# Patient Record
Sex: FEMALE | Race: WHITE | NOT HISPANIC OR LATINO | Employment: STUDENT | URBAN - METROPOLITAN AREA
[De-identification: names, ages, dates, MRNs, and addresses within clinical notes are randomized per-mention and may not be internally consistent; named-entity substitution may affect disease eponyms.]

---

## 2021-06-28 ENCOUNTER — OFFICE VISIT (OUTPATIENT)
Dept: ORTHOPEDIC SURGERY | Facility: CLINIC | Age: 21
End: 2021-06-28

## 2021-06-28 VITALS
SYSTOLIC BLOOD PRESSURE: 119 MMHG | BODY MASS INDEX: 23.03 KG/M2 | WEIGHT: 122 LBS | HEART RATE: 78 BPM | HEIGHT: 61 IN | DIASTOLIC BLOOD PRESSURE: 75 MMHG

## 2021-06-28 DIAGNOSIS — M22.41 PATELLA, CHONDROMALACIA, RIGHT: ICD-10-CM

## 2021-06-28 DIAGNOSIS — M25.561 RIGHT KNEE PAIN, UNSPECIFIED CHRONICITY: Primary | ICD-10-CM

## 2021-06-28 PROCEDURE — 73562 X-RAY EXAM OF KNEE 3: CPT | Performed by: ORTHOPAEDIC SURGERY

## 2021-06-28 PROCEDURE — 99203 OFFICE O/P NEW LOW 30 MIN: CPT | Performed by: ORTHOPAEDIC SURGERY

## 2021-06-28 RX ORDER — MELOXICAM 7.5 MG/1
7.5 TABLET ORAL DAILY
Qty: 30 TABLET | Refills: 5 | Status: SHIPPED | OUTPATIENT
Start: 2021-06-28

## 2021-06-28 NOTE — PROGRESS NOTES
Subjective: Right knee pain     Patient ID: Ema Gilmore is a 20 y.o. female.    Chief Complaint:    History of Present Illness 20-year-old female seen by me today for the first time regarding right knee pain that she has had for about 3 months.  Notes the onset of the pain after turkey hunting season during which time she would commonly sit in a squatted position or on her knees.  The past 3 months she has had persistent reoccurring medial parapatellar discomfort with climbing steps ascending descending stairs or walking hills.  Denies any prior history of knee pain.  She is not taking any medication including anti-inflammatories for the discomfort.  She describes a achy feeling as a 4 out of 10 on a daily basis.  Not having significant pain at rest.  She is taking anti-inflammatories in the past with not currently taking any medication.       Social History     Occupational History   • Not on file   Tobacco Use   • Smoking status: Never Smoker   • Smokeless tobacco: Never Used   Vaping Use   • Vaping Use: Never used   Substance and Sexual Activity   • Alcohol use: Never   • Drug use: Never   • Sexual activity: Defer      Review of Systems   Constitutional: Negative for chills, diaphoresis, fever and unexpected weight change.   HENT: Negative for hearing loss, nosebleeds, sore throat and tinnitus.    Eyes: Negative for pain and visual disturbance.   Respiratory: Negative for cough, shortness of breath and wheezing.    Cardiovascular: Negative for chest pain and palpitations.   Gastrointestinal: Negative for abdominal pain, diarrhea, nausea and vomiting.   Endocrine: Negative for cold intolerance, heat intolerance and polydipsia.   Genitourinary: Negative for difficulty urinating, dysuria and hematuria.   Musculoskeletal: Positive for arthralgias and myalgias. Negative for joint swelling.   Skin: Negative for rash and wound.   Allergic/Immunologic: Negative for environmental allergies.   Neurological: Negative for  dizziness, syncope and numbness.   Hematological: Does not bruise/bleed easily.   Psychiatric/Behavioral: Negative for dysphoric mood and sleep disturbance. The patient is not nervous/anxious.          Past Medical History:   Diagnosis Date   • Fracture of wrist     left   • Fracture, clavicle      Past Surgical History:   Procedure Laterality Date   • EAR TUBES       History reviewed. No pertinent family history.      Objective:  Vitals:    06/28/21 1041   BP: 119/75   Pulse: 78         06/28/21  1041   Weight: 55.3 kg (122 lb)     Body mass index is 23.05 kg/m².        Ortho Exam   AP lateral sunrise view of the right knee evaluated for complaint is completely within normal limits showing no acute or chronic changes.  The patella is well seated in the trochlear groove.  No prior x-rays available.  She is alert and oriented x3.  Has normocephalic and sclerae clear.  The right knee has no swelling effusion erythema the skin is cool to touch.  She has 0 130 degrees of motion with no patellofemoral crepitus or pain.  No patella subluxation.  Mild discomfort along the medial parapatellar area but negative apprehension sign.  Quad and hamstring function are 5/5.  There is no instability 0 90 degrees nor any varus or valgus instability at 10 to 30 degrees.  No joint line tenderness with negative Jerica's.  Calf is nontender.  Good distal pulses no motor or sensory deficit good capillary refill.  She is taking anti-inflammatories in the past without any GI side effect which is not currently taking any medication at this time.    Assessment:        1. Right knee pain, unspecified chronicity    2. Patella, chondromalacia, right           Plan: Reviewed at length with the patient her history x-rays and physical exam.  She has developed a mild case of patella chondromalacia.  After reviewing treatment options of medication versus injections restart meloxicam 7.5 daily.  She was instructed to take the medication every day  and at the end of 3 weeks if still symptomatic she is to return for cortisone injection otherwise as needed.  Answered all questions            Work Status:    JOCE query complete.    Orders:  Orders Placed This Encounter   Procedures   • XR Knee 3+ View With [place] Right       Medications:  New Medications Ordered This Visit   Medications   • meloxicam (MOBIC) 7.5 MG tablet     Sig: Take 1 tablet by mouth Daily.     Dispense:  30 tablet     Refill:  5       Followup:  Return if symptoms worsen or fail to improve.          Dictated utilizing Dragon dictation

## 2021-07-06 ENCOUNTER — TELEPHONE (OUTPATIENT)
Dept: ORTHOPEDIC SURGERY | Facility: CLINIC | Age: 21
End: 2021-07-06

## 2021-07-16 ENCOUNTER — HOSPITAL ENCOUNTER (OUTPATIENT)
Dept: PHYSICAL THERAPY | Facility: HOSPITAL | Age: 21
Setting detail: THERAPIES SERIES
Discharge: HOME | End: 2021-07-16

## 2021-07-16 DIAGNOSIS — M94.261 CHONDROMALACIA OF KNEE, RIGHT: Primary | ICD-10-CM

## 2021-07-16 PROCEDURE — 97161 PT EVAL LOW COMPLEX 20 MIN: CPT | Performed by: PHYSICAL THERAPIST

## 2021-07-16 NOTE — THERAPY EVALUATION
Outpatient Physical Therapy Ortho Initial Evaluation   [facility]     Patient Name: Ema Gilmore  : 2000  MRN: [id number]  Today's Date: 2021      Visit Date: 2021    There is no problem list on file for this patient.       Past Medical History:   Diagnosis Date   • Fracture of wrist     left   • Fracture, clavicle         Past Surgical History:   Procedure Laterality Date   • EAR TUBES         Visit Dx:     ICD-10-CM ICD-9-CM   1. Chondromalacia of knee, right  M94.261 717.7         Patient History     Row Name 21 0730             History    Chief Complaint  Pain;Difficulty with daily activities  -      Type of Pain  Knee pain right  -GC      Brief Description of Current Complaint  Pt reports a several month history of right knee pain that she states feels like it is under the knee cap. She was seen by Dr. Franc Tabares who diagnosed her with chondromalacia patella and referred her to therapy.  -GC      Patient/Caregiver Goals  Relieve pain;Return to prior level of function  -      Patient's Rating of General Health  Good  -GC      Hand Dominance  right-handed  -      Occupation/sports/leisure activities  student  -      What clinical tests have you had for this problem?  X-ray  -      Results of Clinical Tests  negative  -GC         Pain     Pain Location  Knee right  -GC      Pain at Present  0 no pain at rest  -GC      Pain at Best  0  -GC      Pain at Worst  7  -GC      Pain Frequency  Intermittent  -GC      Pain Description  Aching;Discomfort;Sore;Sharp  -GC      What Performance Factors Make the Current Problem(s) WORSE?  Pt c/o pain with squatting, riding bike, going up/down stairs  -      What Performance Factors Make the Current Problem(s) BETTER?  Pt has no pain at rest  -      Difficulties with ADL's?  Pt has pain with stairs, being on feet for ADLs  -      Difficulties with recreational activities?  Pt has pain riding bike  -         Daily Activities     Primary Language  English  -GC      Are you able to read  Yes  -GC      Are you able to write  Yes  -GC      How does patient learn best?  Listening  -GC      Teaching needs identified  Home Exercise Program;Management of Condition  -GC      Patient is concerned about/has problems with  Climbing Stairs;Performing home management (household chores, shopping, care of dependents);Performing job responsibilities/community activities (work, school,;Performing sports, recreation, and play activities;Standing;Walking  -GC      Does patient have problems with the following?  None  -GC      Functional Status  mobility issues preventing performance of daily activities  -GC      Pt Participated in POC and Goals  Yes  -GC         Safety    Are you being hurt, hit, or frightened by anyone at home or in your life?  No  -GC      Are you being neglected by a caregiver  No  -GC        User Key  (r) = Recorded By, (t) = Taken By, (c) = Cosigned By    Initials Name Provider Type    GC Moo García, PT Physical Therapist          PT Ortho     Row Name 07/16/21 0730       Posture/Observations    Posture/Observations Comments  Pt has no obvious effusion or atrophy  -GC       Knee Palpation    Patella  Right:;Tender medial border  -GC       Patellar Accessory Motions    Superior glide  Right:;WNL  -GC    Inferior glide  Right:;WNL  -GC    Medial glide  Right:;WNL  -GC    Lateral glide  Right:;WNL  -GC       Knee Special Tests    Anterior drawer (ACL lesion)  Right:;Negative  -GC    Posterior drawer (PCL lesion)  Right:;Negative  -GC    Valgus stress (MCL lesion)  Right:;Negative  -GC    Varus stress (LCL lesion)  Right:;Negative  -GC    Jerica’s test (meniscal lesion)  Right:;Negative  -GC    Bounce home test (meniscal lesion)  Right:;Negative  -GC    Patellar grind test (chondromalacia patella)  Right:;Positive  -GC    Carlos’s sign (DVT)  Right:;Negative  -GC       Right Lower Ext    Rt Knee Extension/Flexion AROM  0-135 degrees  -GC        MMT Right Lower Ext    Rt Hip Flexion MMT, Gross Movement  (4+/5) good plus  -GC    Rt Hip Extension MMT, Gross Movement  (5/5) normal  -GC    Rt Hip ABduction MMT, Gross Movement  (5/5) normal  -GC    Rt Hip ADduction MMT, Gross Movement  (4+/5) good plus  -GC    Rt Knee Extension MMT, Gross Movement  (4/5) good at TKE  -GC    Rt Knee Flexion MMT, Gross Movement  (5/5) normal  -GC       Sensation    Light Touch  No apparent deficits  -       Lower Extremity Flexibility    Hamstrings  Right:;Mildly limited  -GC    Hip Flexors  Right:;WNL  -GC    Quadriceps  Right:;WNL  -GC    ITB  Right:;WNL  -GC    Gastrocnemius  Right:;WNL  -GC       Transfers    Comment (Transfers)  Pt is independent with all bed mobility and transfers  -       Gait/Stairs (Locomotion)    Comment (Gait/Stairs)  Pt ambulates normally on level surfaces  -      User Key  (r) = Recorded By, (t) = Taken By, (c) = Cosigned By    Initials Name Provider Type    GC Moo García, PT Physical Therapist                      Therapy Education  Given: HEP, Symptoms/condition management, Pain management  Program: New  How Provided: Verbal, Demonstration, Written  Provided to: Patient  Level of Understanding: Teach back education performed, Verbalized, Demonstrated     PT OP Goals     Row Name 07/16/21 0730          PT Short Term Goals    STG Date to Achieve  07/30/21  -     STG 1  Decrease right knee pain to 3-4/10 with activity.  -     STG 2  Increase right hamstring flexibility to WFL with testing.  -     STG 3  Increase right hip and quad strength to at least 4+/5 with testing.  -     STG 4  Pt will be indpendent with her HEP issued by this therapist.  -        Long Term Goals    LTG Date to Achieve  08/13/21  -     LTG 1  Decrease right knee pain to 0-1/10 with activity.  -     LTG 2  Increase right hip and quad strength to 5/5 with testing.  -     LTG 3  Pt will be independent with all ADLs and have a LEFS score > 75.  -         Time Calculation    PT Goal Re-Cert Due Date  08/13/21  -       User Key  (r) = Recorded By, (t) = Taken By, (c) = Cosigned By    Initials Name Provider Type     Moo García, PT Physical Therapist          PT Assessment/Plan     Row Name 07/16/21 0730          PT Assessment    Functional Limitations  Limitations in functional capacity and performance;Performance in sport activities;Limitation in home management;Limitations in community activities  -     Impairments  Impaired flexibility;Pain;Muscle strength  -     Assessment Comments  Pt presents with a several month history of right knee pain that she rates up to 6-7/10 with activities, especially with squatting and going up/down stairs. She has decreased hamstring flexibility, decreased hip and quad strength, and decreased function secodnary to the above.  -     Rehab Potential  Good  -     Patient/caregiver participated in establishment of treatment plan and goals  Yes  -     Patient would benefit from skilled therapy intervention  Yes  -        PT Plan    PT Frequency  1x/week;2x/week  -     Predicted Duration of Therapy Intervention (PT)  4 weeks  -     Planned CPT's?  PT EVAL LOW COMPLEXITY: 94174;PT THER PROC EA 15 MIN: 59577;PT HOT OR COLD PACK TREAT MCARE;PT ELECTRICAL STIM UNATTEND:   -     PT Plan Comments  Pt is to continue her HEP 2x daily  -       User Key  (r) = Recorded By, (t) = Taken By, (c) = Cosigned By    Initials Name Provider Type     Moo García, PT Physical Therapist            OP Exercises     Row Name 07/16/21 0730             Exercise 1    Exercise Name 1  Hamstring stretches  -      Cueing 1  Verbal;Tactile  -      Reps 1  15  -GC      Time 1  10 secs  -GC         Exercise 2    Exercise Name 2  QS with Russian Stim  -      Cueing 2  Verbal;Tactile  -      Time 2  10 min 10/10  -GC         Exercise 3    Exercise Name 3  SLR  -      Cueing 3  Verbal;Tactile  -      Reps 3  25  -GC          Exercise 4    Exercise Name 4  Hip ADD  -GC      Cueing 4  Verbal;Tactile  -GC      Reps 4  25  -GC         Exercise 5    Exercise Name 5  SAQ  -GC      Cueing 5  Verbal;Tactile  -GC      Reps 5  50  -GC         Exercise 6    Exercise Name 6  LAQ/ball squeeze  -GC      Cueing 6  Verbal;Tactile  -GC      Reps 6  50  -GC         Exercise 7    Exercise Name 7  TKE vs theraband  -GC      Cueing 7  Verbal;Demo  -GC      Reps 7  50  -GC      Time 7  silver  -GC        User Key  (r) = Recorded By, (t) = Taken By, (c) = Cosigned By    Initials Name Provider Type    Moo Bergman PT Physical Therapist                        Outcome Measure Options: Lower Extremity Functional Scale (LEFS)  Lower Extremity Functional Index  Any of your usual work, housework or school activities: No difficulty  Your usual hobbies, recreational or sporting activities: A little bit of difficulty  Getting into or out of the bath: No difficulty  Walking between rooms: No difficulty  Putting on your shoes or socks: No difficulty  Squatting: Moderate difficulty  Lifting an object, like a bag of groceries from the floor: No difficulty  Performing light activities around your home: No difficulty  Performing heavy activities around your home: A little bit of difficulty  Getting into or out of a car: No difficulty  Walking 2 blocks: No difficulty  Walking a mile: No difficulty  Going up or down 10 stairs (about 1 flight of stairs): A little bit of difficulty  Standing for 1 hour: No difficulty  Sitting for 1 hour: No difficulty  Running on even ground: No difficulty  Running on uneven ground: No difficulty  Making sharp turns while running fast: No difficulty  Hopping: No difficulty  Rolling over in bed: No difficulty  Total: 75      Time Calculation:     Start Time: 0730  Stop Time: 0828  Time Calculation (min): 58 min     Therapy Charges for Today     Code Description Service Date Service Provider Modifiers Qty    58620192273  PT EVAL LOW  COMPLEXITY 3 7/16/2021 Moo García, PT GP 1          PT G-Codes  Outcome Measure Options: Lower Extremity Functional Scale (LEFS)  Total: 75         Moo García, PT  7/16/2021

## 2021-07-22 ENCOUNTER — HOSPITAL ENCOUNTER (OUTPATIENT)
Dept: PHYSICAL THERAPY | Facility: HOSPITAL | Age: 21
Setting detail: THERAPIES SERIES
Discharge: HOME | End: 2021-07-22

## 2021-07-22 DIAGNOSIS — M94.261 CHONDROMALACIA OF KNEE, RIGHT: Primary | ICD-10-CM

## 2021-07-22 PROCEDURE — 97110 THERAPEUTIC EXERCISES: CPT | Performed by: PHYSICAL THERAPIST

## 2021-07-22 NOTE — THERAPY TREATMENT NOTE
Outpatient Physical Therapy [facility] Treatment Note  [facility] [place]     Patient Name: Ema Gilmore  : 2000  MRN: [id number]  Today's Date: 2021      Visit Date: 2021    Visit Dx:    ICD-10-CM ICD-9-CM   1. Chondromalacia of knee, right  M94.261 717.7       There is no problem list on file for this patient.       Past Medical History:   Diagnosis Date   • Fracture of wrist     left   • Fracture, clavicle         Past Surgical History:   Procedure Laterality Date   • EAR TUBES                         PT Assessment/Plan     Row Name 21 0830          PT Assessment    Assessment Comments  Pt tolerated her exercise progression well.  -GC        PT Plan    PT Plan Comments  Pt is to continue her HEP daily.  -GC       User Key  (r) = Recorded By, (t) = Taken By, (c) = Cosigned By    Initials Name Provider Type    Moo Bergman, PT Physical Therapist            OP Exercises     Row Name 21 0830             Subjective Comments    Subjective Comments  Pt states the exercises made her leg very tired.  -GC         Exercise 1    Exercise Name 1  Hamstring stretches  -GC      Cueing 1  Verbal;Tactile  -GC      Reps 1  15  -GC      Time 1  10 secs  -GC         Exercise 2    Exercise Name 2  QS with Russian Stim  -GC      Cueing 2  Verbal;Tactile  -GC      Time 2  10 min 10/10  -GC         Exercise 3    Exercise Name 3  SLR  -GC      Cueing 3  Verbal;Tactile  -GC      Reps 3  25  -GC      Time 3  1#  -GC         Exercise 4    Exercise Name 4  Hip ADD  -GC      Cueing 4  Verbal;Tactile  -GC      Reps 4  25  -GC      Time 4  1#  -GC         Exercise 5    Exercise Name 5  SAQ  -GC      Cueing 5  Verbal;Tactile  -GC      Reps 5  50  -GC      Time 5  1#  -GC         Exercise 6    Exercise Name 6  LAQ/ball squeeze  -GC      Cueing 6  Verbal;Tactile  -GC      Reps 6  50  -GC      Time 6  1#  -GC         Exercise 7    Exercise Name 7  TKE vs theraband  -GC      Cueing 7  Verbal;Demo  -GC      Reps 7  50   -GC      Time 7  gold  -        User Key  (r) = Recorded By, (t) = Taken By, (c) = Cosigned By    Initials Name Provider Type    GC Moo García, PT Physical Therapist                                          Time Calculation:   Start Time: 0830  Stop Time: 0912  Time Calculation (min): 42 min  Therapy Charges for Today     Code Description Service Date Service Provider Modifiers Qty    93490679685  PT THER PROC EA 15 MIN 7/22/2021 Moo García, PT GP 2                    Moo García, PT  7/22/2021

## 2021-07-29 ENCOUNTER — HOSPITAL ENCOUNTER (OUTPATIENT)
Dept: PHYSICAL THERAPY | Facility: HOSPITAL | Age: 21
Setting detail: THERAPIES SERIES
Discharge: HOME | End: 2021-07-29

## 2021-07-29 DIAGNOSIS — M94.261 CHONDROMALACIA OF KNEE, RIGHT: Primary | ICD-10-CM

## 2021-07-29 PROCEDURE — 97110 THERAPEUTIC EXERCISES: CPT | Performed by: PHYSICAL THERAPIST

## 2021-07-29 NOTE — THERAPY TREATMENT NOTE
[facility] [place] [place]     Patient Name: Ema Gilmore  : 2000  MRN: [id number]  Today's Date: 2021      Visit Date: 2021    Visit Dx:    ICD-10-CM ICD-9-CM   1. Chondromalacia of knee, right  M94.261 717.7       There is no problem list on file for this patient.       Past Medical History:   Diagnosis Date   • Fracture of wrist     left   • Fracture, clavicle         Past Surgical History:   Procedure Laterality Date   • EAR TUBES                         PT Assessment/Plan     Row Name 21 1030          PT Assessment    Assessment Comments  Pt is showing increased strength and had good tolerance to her exercise progression.  -GC        PT Plan    PT Plan Comments  Pt is to continue her HEP daily.  -GC       User Key  (r) = Recorded By, (t) = Taken By, (c) = Cosigned By    Initials Name Provider Type    Moo Bergman, PT Physical Therapist            OP Exercises     Row Name 21 1030             Subjective Comments    Subjective Comments  Pt states she can still feel pain on the inside of her knee when she walks.  -GC         Exercise 1    Exercise Name 1  Hamstring stretches  -GC      Cueing 1  Verbal;Tactile  -GC      Reps 1  15  -GC      Time 1  10 secs  -GC         Exercise 2    Exercise Name 2  QS with Russian Stim  -GC      Cueing 2  Verbal;Tactile  -GC      Time 2  10 min 10/10  -GC         Exercise 3    Exercise Name 3  SLR  -GC      Cueing 3  Verbal;Tactile  -GC      Reps 3  25  -GC      Time 3  2#  -GC         Exercise 4    Exercise Name 4  Hip ADD  -GC      Cueing 4  Verbal;Tactile  -GC      Reps 4  25  -GC      Time 4  2#  -GC         Exercise 5    Exercise Name 5  SAQ  -GC      Cueing 5  Verbal;Tactile  -GC      Reps 5  50  -GC      Time 5  2#  -GC         Exercise 6    Exercise Name 6  LAQ/ball squeeze  -GC      Cueing 6  Verbal;Tactile  -GC      Reps 6  50  -GC      Time 6  2#  -GC         Exercise 7    Exercise Name 7  TKE  "vs theraband  -GC      Cueing 7  Verbal;Demo  -GC      Reps 7  50  -GC      Time 7  gold  -GC         Exercise 8    Exercise Name 8  Partial squat/ball squeeze  -GC      Cueing 8  Verbal;Demo  -GC      Reps 8  25  -GC         Exercise 9    Exercise Name 9  Lateral Dips on 4\" step  -GC      Cueing 9  Verbal;Demo  -GC      Reps 9  25  -GC        User Key  (r) = Recorded By, (t) = Taken By, (c) = Cosigned By    Initials Name Provider Type     Moo García, PT Physical Therapist                                          Time Calculation:      Therapy Charges for Today     Code Description Service Date Service Provider Modifiers Qty    85203509366 HC PT THER PROC EA 15 MIN 7/29/2021 Moo García, PT GP 2                    Moo García, PT  7/29/2021     "

## 2021-08-05 ENCOUNTER — HOSPITAL ENCOUNTER (OUTPATIENT)
Dept: PHYSICAL THERAPY | Facility: HOSPITAL | Age: 21
Setting detail: THERAPIES SERIES
Discharge: HOME | End: 2021-08-05

## 2021-08-05 DIAGNOSIS — M94.261 CHONDROMALACIA OF KNEE, RIGHT: Primary | ICD-10-CM

## 2021-08-05 PROCEDURE — 97110 THERAPEUTIC EXERCISES: CPT | Performed by: PHYSICAL THERAPIST

## 2021-08-05 NOTE — THERAPY TREATMENT NOTE
[facility] [place] [place] [place]     Patient Name: Ema Gilmore  : 2000  MRN: [id number]  Today's Date: 2021      Visit Date: 2021    Visit Dx:    ICD-10-CM ICD-9-CM   1. Chondromalacia of knee, right  M94.261 717.7       There is no problem list on file for this patient.       Past Medical History:   Diagnosis Date   • Fracture of wrist     left   • Fracture, clavicle         Past Surgical History:   Procedure Laterality Date   • EAR TUBES                         PT Assessment/Plan     Row Name 21 1140          PT Assessment    Assessment Comments  Pt is reporting decreased pain and symptoms, but she is still sore.  -GC        PT Plan    PT Plan Comments  Pt is to continue her HEP daily.  -GC       User Key  (r) = Recorded By, (t) = Taken By, (c) = Cosigned By    Initials Name Provider Type    Moo Bergman, PT Physical Therapist            OP Exercises     Row Name 21 1140             Subjective Comments    Subjective Comments  Pt states her knee feels better, but she was very sore after last week.  -GC         Exercise 1    Exercise Name 1  Hamstring stretches  -GC      Cueing 1  Verbal;Tactile  -GC      Reps 1  15  -GC      Time 1  10 secs  -GC         Exercise 2    Exercise Name 2  QS with Russian Stim  -GC      Cueing 2  Verbal;Tactile  -GC      Time 2  10 min 10/10  -GC         Exercise 3    Exercise Name 3  SLR  -GC      Cueing 3  Verbal;Tactile  -GC      Reps 3  30  -GC      Time 3  2#  -GC         Exercise 4    Exercise Name 4  Hip ADD  -GC      Cueing 4  Verbal;Tactile  -GC      Reps 4  30  -GC      Time 4  2#  -GC         Exercise 5    Exercise Name 5  SAQ  -GC      Cueing 5  Verbal;Tactile  -GC      Reps 5  50  -GC      Time 5  2#  -GC         Exercise 6    Exercise Name 6  LAQ/ball squeeze  -GC      Cueing 6  Verbal;Tactile  -GC      Reps 6  50  -GC      Time 6  2#  -GC         Exercise 7    Exercise Name 7  TKE vs theraband  -GC    "   Cueing 7  Verbal;Demo  -GC      Reps 7  50  -GC      Time 7  gold  -GC         Exercise 8    Exercise Name 8  Partial squat/ball squeeze  -GC      Cueing 8  Verbal;Demo  -GC      Reps 8  30  -GC         Exercise 9    Exercise Name 9  Lateral Dips on 4\" step  -GC      Cueing 9  Verbal;Demo  -GC      Reps 9  30  -GC        User Key  (r) = Recorded By, (t) = Taken By, (c) = Cosigned By    Initials Name Provider Type     Moo García, PT Physical Therapist                                          Time Calculation:   Start Time: 1140  Stop Time: 1224  Time Calculation (min): 44 min  Therapy Charges for Today     Code Description Service Date Service Provider Modifiers Qty    88875049447 HC PT THER PROC EA 15 MIN 8/5/2021 Moo García, PT GP 2                    Moo García, PT  8/5/2021     "

## 2021-08-12 ENCOUNTER — HOSPITAL ENCOUNTER (OUTPATIENT)
Dept: PHYSICAL THERAPY | Facility: HOSPITAL | Age: 21
Setting detail: THERAPIES SERIES
Discharge: HOME | End: 2021-08-12

## 2021-08-12 DIAGNOSIS — M94.261 CHONDROMALACIA OF KNEE, RIGHT: Primary | ICD-10-CM

## 2021-08-12 PROCEDURE — 97110 THERAPEUTIC EXERCISES: CPT

## 2021-08-18 ENCOUNTER — HOSPITAL ENCOUNTER (OUTPATIENT)
Dept: PHYSICAL THERAPY | Facility: HOSPITAL | Age: 21
Setting detail: THERAPIES SERIES
Discharge: HOME | End: 2021-08-18

## 2021-08-18 DIAGNOSIS — M94.261 CHONDROMALACIA OF KNEE, RIGHT: Primary | ICD-10-CM

## 2021-08-18 PROCEDURE — 97110 THERAPEUTIC EXERCISES: CPT

## 2021-08-18 NOTE — THERAPY TREATMENT NOTE
Outpatient Physical Therapy Ortho Treatment Note  [facility]     Patient Name: Ema Gilmore  : 2000  MRN: [id number]  Today's Date: 2021      Visit Date: 2021    Visit Dx:    ICD-10-CM ICD-9-CM   1. Chondromalacia of knee, right  M94.261 717.7       There is no problem list on file for this patient.       Past Medical History:   Diagnosis Date   • Fracture of wrist     left   • Fracture, clavicle         Past Surgical History:   Procedure Laterality Date   • EAR TUBES                         PT Assessment/Plan     Row Name 21          PT Assessment    Assessment Comments  Pt has made good progress toward goals with improved strength/function with decreased pain. Continued to progress strengthening as tolerated.   -KM        PT Plan    PT Plan Comments  With pts improved status, plan to transition to HEP.   -KM       User Key  (r) = Recorded By, (t) = Taken By, (c) = Cosigned By    Initials Name Provider Type    Mirna Bang PTA Physical Therapy Assistant            OP Exercises     Row Name 21             Subjective Comments    Subjective Comments  Pt states her knee is feeling much better and able to complete daily activities without limitations.   -KM         Exercise 1    Exercise Name 1  Hamstring stretches  -KM      Cueing 1  Verbal;Tactile  -KM      Reps 1  15  -KM      Time 1  10 secs  -KM         Exercise 2    Exercise Name 2  QS with Russian Stim  -KM      Cueing 2  Verbal;Tactile  -KM      Time 2  10 min 10/10  -KM         Exercise 3    Exercise Name 3  SLR  -KM      Cueing 3  Verbal;Tactile  -KM      Reps 3  25  -KM      Time 3  4#  -KM         Exercise 4    Exercise Name 4  Hip ADD  -KM      Cueing 4  Verbal;Tactile  -KM      Reps 4  25  -KM      Time 4  4#  -KM         Exercise 5    Exercise Name 5  SAQ  -KM      Cueing 5  Verbal;Tactile  -KM      Reps 5  50  -KM      Time 5  4#  -KM         Exercise 6    Exercise Name 6  LAQ/ball squeeze  -KM       "Cueing 6  Verbal;Tactile  -KM      Reps 6  50  -KM      Time 6  4#  -KM         Exercise 7    Exercise Name 7  TKE vs theraband  -KM      Cueing 7  Verbal;Demo  -KM      Reps 7  50  -KM      Time 7  gold  -KM         Exercise 8    Exercise Name 8  Partial squat/ball squeeze  -KM      Cueing 8  Verbal;Demo  -KM      Reps 8  30  -KM         Exercise 9    Exercise Name 9  Lateral Dips on 6\" step  -KM      Cueing 9  Verbal;Demo  -KM      Reps 9  25  -KM        User Key  (r) = Recorded By, (t) = Taken By, (c) = Cosigned By    Initials Name Provider Type    Mirna Bang PTA Physical Therapy Assistant                       PT OP Goals     Row Name 08/18/21 0930          PT Short Term Goals    STG Date to Achieve  07/30/21  -KM     STG 1  Decrease right knee pain to 3-4/10 with activity.  -KM     STG 1 Progress  Met  -KM     STG 2  Increase right hamstring flexibility to WFL with testing.  -KM     STG 2 Progress  Met  -KM     STG 3  Increase right hip and quad strength to at least 4+/5 with testing.  -KM     STG 3 Progress  Ongoing  -KM     STG 4  Pt will be indpendent with her HEP issued by this therapist.  -KM     STG 4 Progress  Met  -KM        Long Term Goals    LTG Date to Achieve  08/13/21  -KM     LTG 1  Decrease right knee pain to 0-1/10 with activity.  -KM     LTG 1 Progress  Met  -KM     LTG 2  Increase right hip and quad strength to 5/5 with testing.  -KM     LTG 2 Progress  Ongoing  -KM     LTG 3  Pt will be independent with all ADLs and have a LEFS score > 75.  -KM       User Key  (r) = Recorded By, (t) = Taken By, (c) = Cosigned By    Initials Name Provider Type    Mirna Bang PTA Physical Therapy Assistant                         Time Calculation:   Start Time: 0930  Stop Time: 1008  Time Calculation (min): 38 min  Therapy Charges for Today     Code Description Service Date Service Provider Modifiers Qty    [id number] HC PT THER PROC EA 15 MIN 8/18/2021 Mirna Lomeli PTA GP 1            "         Mirna Lomeli, PTA  8/18/2021